# Patient Record
Sex: MALE | Employment: UNEMPLOYED | URBAN - METROPOLITAN AREA
[De-identification: names, ages, dates, MRNs, and addresses within clinical notes are randomized per-mention and may not be internally consistent; named-entity substitution may affect disease eponyms.]

---

## 2019-12-29 ENCOUNTER — APPOINTMENT (OUTPATIENT)
Dept: CT IMAGING | Facility: CLINIC | Age: 49
End: 2019-12-29
Attending: EMERGENCY MEDICINE
Payer: COMMERCIAL

## 2019-12-29 ENCOUNTER — HOSPITAL ENCOUNTER (EMERGENCY)
Facility: CLINIC | Age: 49
Discharge: HOME | End: 2019-12-29
Attending: EMERGENCY MEDICINE | Admitting: EMERGENCY MEDICINE
Payer: COMMERCIAL

## 2019-12-29 VITALS
OXYGEN SATURATION: 97 % | WEIGHT: 266.4 LBS | TEMPERATURE: 98 F | RESPIRATION RATE: 13 BRPM | BODY MASS INDEX: 40.38 KG/M2 | HEART RATE: 91 BPM | HEIGHT: 68 IN | DIASTOLIC BLOOD PRESSURE: 74 MMHG | SYSTOLIC BLOOD PRESSURE: 140 MMHG

## 2019-12-29 DIAGNOSIS — H53.9 VISUAL DISTURBANCE: ICD-10-CM

## 2019-12-29 LAB
ANION GAP SERPL CALCULATED.3IONS-SCNC: 7 MMOL/L (ref 3–14)
APTT PPP: 28 SEC (ref 22–37)
BASOPHILS # BLD AUTO: 0 10E9/L (ref 0–0.2)
BASOPHILS NFR BLD AUTO: 0.4 %
BUN SERPL-MCNC: 23 MG/DL (ref 7–30)
CALCIUM SERPL-MCNC: 8.8 MG/DL (ref 8.5–10.1)
CHLORIDE SERPL-SCNC: 107 MMOL/L (ref 94–109)
CO2 SERPL-SCNC: 26 MMOL/L (ref 20–32)
CREAT SERPL-MCNC: 0.86 MG/DL (ref 0.66–1.25)
DIFFERENTIAL METHOD BLD: NORMAL
EOSINOPHIL # BLD AUTO: 0.3 10E9/L (ref 0–0.7)
EOSINOPHIL NFR BLD AUTO: 3.2 %
ERYTHROCYTE [DISTWIDTH] IN BLOOD BY AUTOMATED COUNT: 13.8 % (ref 10–15)
GFR SERPL CREATININE-BSD FRML MDRD: >90 ML/MIN/{1.73_M2}
GLUCOSE SERPL-MCNC: 146 MG/DL (ref 70–99)
HCT VFR BLD AUTO: 41.7 % (ref 40–53)
HGB BLD-MCNC: 14.3 G/DL (ref 13.3–17.7)
IMM GRANULOCYTES # BLD: 0 10E9/L (ref 0–0.4)
IMM GRANULOCYTES NFR BLD: 0.3 %
INR PPP: 1.03 (ref 0.86–1.14)
LYMPHOCYTES # BLD AUTO: 4.2 10E9/L (ref 0.8–5.3)
LYMPHOCYTES NFR BLD AUTO: 44.9 %
MCH RBC QN AUTO: 28.5 PG (ref 26.5–33)
MCHC RBC AUTO-ENTMCNC: 34.3 G/DL (ref 31.5–36.5)
MCV RBC AUTO: 83 FL (ref 78–100)
MONOCYTES # BLD AUTO: 0.8 10E9/L (ref 0–1.3)
MONOCYTES NFR BLD AUTO: 8.4 %
NEUTROPHILS # BLD AUTO: 4 10E9/L (ref 1.6–8.3)
NEUTROPHILS NFR BLD AUTO: 42.8 %
NRBC # BLD AUTO: 0 10*3/UL
NRBC BLD AUTO-RTO: 0 /100
PLATELET # BLD AUTO: 291 10E9/L (ref 150–450)
POTASSIUM SERPL-SCNC: 3.2 MMOL/L (ref 3.4–5.3)
RBC # BLD AUTO: 5.01 10E12/L (ref 4.4–5.9)
SODIUM SERPL-SCNC: 140 MMOL/L (ref 133–144)
WBC # BLD AUTO: 9.4 10E9/L (ref 4–11)

## 2019-12-29 PROCEDURE — 85610 PROTHROMBIN TIME: CPT | Performed by: EMERGENCY MEDICINE

## 2019-12-29 PROCEDURE — 99291 CRITICAL CARE FIRST HOUR: CPT | Mod: 25

## 2019-12-29 PROCEDURE — 70450 CT HEAD/BRAIN W/O DYE: CPT

## 2019-12-29 PROCEDURE — 99207 ZZC NO BILLABLE SERVICE THIS VISIT: CPT | Mod: GO | Performed by: PSYCHIATRY & NEUROLOGY

## 2019-12-29 PROCEDURE — 93005 ELECTROCARDIOGRAM TRACING: CPT

## 2019-12-29 PROCEDURE — 80048 BASIC METABOLIC PNL TOTAL CA: CPT | Performed by: EMERGENCY MEDICINE

## 2019-12-29 PROCEDURE — 25000125 ZZHC RX 250: Performed by: EMERGENCY MEDICINE

## 2019-12-29 PROCEDURE — 99292 CRITICAL CARE ADDL 30 MIN: CPT

## 2019-12-29 PROCEDURE — 0042T CT HEAD PERFUSION WITH CONTRAST: CPT

## 2019-12-29 PROCEDURE — 85730 THROMBOPLASTIN TIME PARTIAL: CPT | Performed by: EMERGENCY MEDICINE

## 2019-12-29 PROCEDURE — 70496 CT ANGIOGRAPHY HEAD: CPT

## 2019-12-29 PROCEDURE — 25000128 H RX IP 250 OP 636: Performed by: EMERGENCY MEDICINE

## 2019-12-29 PROCEDURE — 85025 COMPLETE CBC W/AUTO DIFF WBC: CPT | Performed by: EMERGENCY MEDICINE

## 2019-12-29 RX ORDER — IOPAMIDOL 755 MG/ML
120 INJECTION, SOLUTION INTRAVASCULAR ONCE
Status: COMPLETED | OUTPATIENT
Start: 2019-12-29 | End: 2019-12-29

## 2019-12-29 RX ADMIN — IOPAMIDOL 120 ML: 755 INJECTION, SOLUTION INTRAVENOUS at 04:09

## 2019-12-29 RX ADMIN — SODIUM CHLORIDE 100 ML: 9 INJECTION, SOLUTION INTRAVENOUS at 04:09

## 2019-12-29 ASSESSMENT — ENCOUNTER SYMPTOMS
ABDOMINAL PAIN: 0
VOMITING: 0
WEAKNESS: 1
HEADACHES: 1
SHORTNESS OF BREATH: 0

## 2019-12-29 NOTE — ED AVS SNAPSHOT
Emergency Department  [address] [place] [address]  Phone:  [phone]  Fax:  [phone]                                    Reji Quiroz   MRN: [id number]    Department:   Emergency Department   Date of Visit:  12/29/2019           After Visit Summary Signature Page    I have received my discharge instructions, and my questions have been answered. I have discussed any challenges I see with this plan with the nurse or doctor.    ..........................................................................................................................................  Patient/Patient Representative Signature      ..........................................................................................................................................  Patient Representative Print Name and Relationship to Patient    ..................................................               ................................................  Date                                   Time    ..........................................................................................................................................  Reviewed by Signature/Title    ...................................................              ..............................................  Date                                               Time          22EPIC Rev 08/18

## 2019-12-29 NOTE — ED PROVIDER NOTES
"  History     Chief Complaint:    Cerebrovascular Accident      The history is provided by the patient, the spouse and the EMS personnel.      Reji Quiroz is a 49 year old male with a history of TIA, heart block, hypertension, and pacemaker placement who presents via EMS with right sided vision loss, right sided weakness, and a \"splitting\" headache that onset at 0230 this morning while the patient was watching TV. The patient felt normal prior to onset. The patient was hospitalized two weeks ago for a TIA. He was sent home on Plavix and his symptoms had completely resolved. He states his symptoms today felt similar. Now, he reports his vision is just blurry but he still feels week. The patient has been taking all of his medications as prescribed. Of note, the patient needs a right hip replacement.     Allergies:  No Known Drug Allergies     Medications:    Colace  Valtrex  Valium  Flomax  Revatio  Lisinopril  Norvasc  Prilosec  Ultram  Neurontin  Albuterol inhaler  Aspirin 81 mg   Plavix  Lipitor    Past Medical History:    Vasovagal syncope  Heart block AV second degree  Renal Calculi  Hypertension  KATHLEEN  TIA    Past Surgical History:    Shoulder arthroplasty  Sigmoidectomy  Appendectomy     Family History:    Father: heart disease     Social History:  Presents to the ED with EMS and wife at the bedside  Tobacco Use: former smoker  Alcohol Use: no  Drug Use: no     Review of Systems   Eyes: Positive for visual disturbance.   Respiratory: Negative for shortness of breath.    Cardiovascular: Negative for chest pain.   Gastrointestinal: Negative for abdominal pain and vomiting.   Neurological: Positive for weakness and headaches.   All other systems reviewed and are negative.        Physical Exam     Patient Vitals for the past 24 hrs:   BP Temp Temp src Pulse Heart Rate Resp SpO2 Height Weight   12/29/19 0423 137/83 98  F (36.7  C) Oral 93 93 14 96 % 1.727 m (5' 8\") --   12/29/19 0345 -- -- -- -- -- -- -- -- 120.8 " kg (266 lb 6.4 oz)       Physical Exam  General: Appears well-developed and well-nourished.   Head: No signs of trauma.   Mouth/Throat: Oropharynx is clear and moist.   Eyes: Conjunctivae are normal. Pupils are equal, round, and reactive to light.   Neck: Normal range of motion. No nuchal rigidity. No cervical adenopathy  CV: Normal rate and regular rhythm.    Resp: Effort normal and breath sounds normal. No respiratory distress.   GI: Soft. There is no tenderness.  No rebound or guarding.  Normal bowel sounds.  No CVA tenderness.  MSK: Normal range of motion. no edema. No Calf tenderness.  Neuro: The patient is alert and oriented to person, place, and time.  PERRLA, EOMI, Slight decrease in  strength to right hand compared to left.  Able to hold arms up without difficulty.  Difficulty in lifting right leg secondary to chronic hip pain, at baseline.  Sensation normal. Speech normal.  Visual fields intact.  No facial droop.  GCS 15  Skin: Skin is warm and dry. No rash noted.   Psych: normal mood and affect. behavior is normal.     Emergency Department Course     ECG:  Indication: cerebrovascular incident  Time: 0423  Vent. Rate 94 bpm. MO interval 174. QRS duration 70. QT/QTc 348/435. P-R-T axis 43 32 -10.  Normal sinus rhythm. Cannot rule out anterior infarct, age undetermined. Abnormal EKG. Read time: 0447.     Imaging:  Radiology findings were communicated with the patient who voiced understanding of the findings.    CT Head w/o IV contrast:   No acute intracranial process.    CTA Head Neck  w/ IV contrast:   Normal CTA [place].  Normal neck CTA.    CT Head Perfusion w/ IV contrast:   Normal cerebral perfusion.    Readings as per radiology.     Laboratory:  Laboratory findings were communicated with the patient who voiced understanding of the findings.    CBC: WBC: 9.4, HGB: 14.3, PLT: 291  BMP: Potassium 3.2 (L), Glucose 146 (H) o/w WNL (Creatinine 0.86)  INR: 1.03  PTT: 28    Emergency Department  Course:  Past medical records, nursing notes, and vitals reviewed.    EKG obtained in the ED, see results above.   IV was inserted and blood was drawn for laboratory testing, results above.  The patient was sent for a CT while in the emergency department, results above.     0342: Patient arrives via EMS  0343: EKG obtained.   0343: I performed an exam of the patient as documented above.   0345: Code stroke initiated.   0347: I consulted with stroke neuro regarding the patient's history and presentation here in the emergency department.  0350: Patient leaves for a CT scan.   0351: I discussed the patient's history with his wife.   0415: I consulted with stroke neuro.   0417: Patient rechecked and updated.    0434: Code stroke deescalated.   0443: I consulted with Dr. Clark, stroke/neruo, regarding his examination of the patient.   0445: Patient rechecked and updated.      Findings and plan explained to the Patient. Patient discharged home with instructions regarding supportive care, medications, and reasons to return. The importance of close follow-up was reviewed.     I personally reviewed the laboratory and imaging results with the Patient and answered all related questions prior to discharge.      Impression & Plan     Medical Decision Making:  Reji Quiroz is a 49-year-old gentleman who presents due to visual disturbance and right-sided weakness.  Symptoms started while the patient was awake at approximately 0230.  He apparently has a history of having repeated episodes with the same symptoms and he was actually seen at [facility] just a few weeks ago for the same complaint.  On my evaluation, he did have a slightly decreased  strength on the right compared to the left although his gross motor movements were appropriate.  He did not seem to have a visual field cut, but rather had decreased vision in the right eye only.  At the time of my evaluation, he stated it felt like the vision was improving and is now  only somewhat blurry.  I did call a code stroke and imaging was obtained that did not show any signs of acute process.  Dr. Clark did evaluate the patient as well.  On repeat evaluation, the patient did have improvement of his strength and reports that his vision was also continuing to improve.  Based on his symptoms, the distribution affecting the right eye itself along with right arm numbness would be somewhat surprising for acute stroke as these are somewhat different vascular systems.  After speaking with the patient, his symptoms may potentially be related to migraine and stress reaction.  Patient is already on TIA stroke prevention therapy with aspirin and Plavix.  Dr. Clark and myself did discuss the option for admission, but ultimately the patient felt comfortable with going home which Dr. Clark felt was appropriate as he would not do any further work-up such as MRI or changes in medication.  Patient and his wife were given very clear instructions to return immediately if he had any worsening symptoms or further concerns.  Patient does have plans to follow-up with both neurology and ophthalmology which she was encouraged to do.    Discharge Diagnosis:    ICD-10-CM    1. Visual disturbance H53.9            Disposition:  Discharged home.      Scribe Disclosure:  I, Abby Clayton, am serving as a scribe at 3:47 AM on 12/29/2019 to document services personally performed by Delvis Wharton MD based on my observations and the provider's statements to me.     12/29/2019    EMERGENCY DEPARTMENT       Delvis Wharton MD  12/29/19 0757

## 2019-12-29 NOTE — CONSULTS
"[facility]    Stroke Consult Note    Reason for Consult: acute onset right-sided weakness, right visual field loss    Chief Complaint: Cerebrovascular Accident      HPI  Reji Quiroz is a 49 year old male with sick sinus syndrome s/p pacemaker, degenerative hip disease, HTN, who presents with gradual onset vision loss right eye and mild right sided weakness.  He describes his weakness as \"jitteriness\" and states that he was really worked up and stressed.  He has had 5 similar episodes per himself and his wife and these only occur when he is \"worked up\".  He has had MRI scans and Islam that were unrevealing.  His recent stroke work-up was unremarkable and he was started on dual antiplatelet therapy early this month which he is still taking.    He notes no clear history of migraines, but does have headaches and has seen a scotoma (\"flashing lights\") one time.  He is certain that his vision loss was his right eye alone and non his visual field.  He remembers the events clearly and has never had a seizure.    CT/CTA unrevealing.  CTP mild hypoperfusion in the left temporal and occipital lobe.    TPA Treatment   Not given due to minor / isolated / quickly resolving stroke symptoms.    Endovascular Treatment  Not initiated due to absence of proximal vessel occlusion    Impression  1. Recurrent vision loss right eye  2. Recurrent right sided weakness.     Because these events generally occur together and are stereotyped it is less likely that they represent stroke without underlying focal atherosclerosis/stenosis.  It is difficult to localize his symptoms which occur on the right body and right eye.    Stereotyped events may occur in the setting of aura, partial seizure, amyloid spells/cortical siderosis which may explain the hypoperfusion seen on CTP.    Recommendations  1. Continue stroke secondary prevention as per work-up on prior admission  2. Follow-up at [facility] for Stroke  3. Follow-up " with ophthalmology    Patient Follow-up    As above.  Patient already has follow-up set up from his recent evaluation for similar symptoms    Thank you for this consult. No further stroke evaluation is recommended, so we will sign off. Please contact us with any additional questions.    Keny Salgado MD, MS  Vascular Neurology    Text Page ([phone])  ______________________________________________________    Past Medical History   No past medical history on file.  Past Surgical History   No past surgical history on file.  Medications   Home Meds  Prior to Admission medications    Not on File       Scheduled Meds      Infusion Meds      PRN Meds      Allergies   No Known Allergies  Family History   No family history on file.  Social History   Social History     Tobacco Use     Smoking status: Not on file   Substance Use Topics     Alcohol use: Not on file     Drug use: Not on file       Review of Systems   The 10 point Review of Systems is negative other than noted in the HPI or here.        PHYSICAL EXAMINATION  Temp:  [98  F (36.7  C)] 98  F (36.7  C)  Pulse:  [93] 93  Heart Rate:  [93] 93  Resp:  [14] 14  BP: (137)/(83) 137/83  SpO2:  [96 %] 96 %     Neuro:  Mental status: Awake, alert, attentive, oriented x3. Speech is fluent, comprehension and repetition intact. No dysarthria.  Good historian.  No neglect.  Cranial nerves: EOMI, visual fields full, right eye remains blurry but he can see, left eye normal, face symmetric, facial sensation intact, shoulder shrug strong, tongue/uvula midline, hearing intact to conversation.  Motor: 5/5 strength, he is has degenerative disease of the right hip and testing is limited due to pain  Sensory (assessed via nursing): Intact to light touch in face, arms, and legs  Coordination: Finger to nose intact bilaterally without dysmetria.  Normal heel-shin test bilaterally  Gait: Deferred         Stroke Scales    NIHSS  Interval baseline (12/29/19 4746)   Interval Comments     1a.  Level of Consciousness 0-->Alert, keenly responsive   1b. LOC Questions 0-->Answers both questions correctly   1c. LOC Commands 0-->Performs both tasks correctly   2.   Best Gaze 0-->Normal   3.   Visual 0-->No visual loss   4.   Facial Palsy 0-->Normal symmetrical movements   5a. Motor Arm, Left 0-->No drift, limb holds 90 (or 45) degrees for full 10 secs   5b. Motor Arm, Right 0-->No drift, limb holds 90 (or 45) degrees for full 10 secs   6a. Motor Leg, Left 0-->No drift, leg holds 30 degree position for full 5 secs   6b. Motor Leg, right 1-->Drift, leg falls by the end of the 5-sec period but does not hit bed   7.   Limb Ataxia 0-->Absent   8.   Sensory 0-->Normal, no sensory loss   9.   Best Language 0-->No aphasia, normal   10. Dysarthria 0-->Normal   11. Extinction and Inattention  0-->No abnormality   Total 1 (12/29/19 0456)       Imaging  I personally reviewed all imaging; relevant findings per HPI.     Lab Results Data   CBC  Recent Labs   Lab 12/29/19 0346   WBC 9.4   RBC 5.01   HGB 14.3   HCT 41.7        Basic Metabolic Panel    Recent Labs   Lab 12/29/19 0346      POTASSIUM 3.2*   CHLORIDE 107   CO2 26   BUN 23   CR 0.86   *   EVETTE 8.8     Liver Panel  No lab results found.  INR  Recent Labs   Lab Test 12/29/19 0346   INR 1.03      Lipid ProfileNo lab results found.  A1CNo lab results found.  Troponin Whitney results for input(s): TROPI in the last 168 hours.       Stroke Code / Stroke Consult Data Data   Stroke Code Data  (for stroke code with tele)  Stroke code activated 12/29/19   0347   First stroke provider response 12/29/19   0349   Video start time 12/29/19   0420   Video end time 12/29/19   0442   Last known normal 12/29/19   0239   Time of discovery  (or onset of symptoms)  12/29/19   0239   Head CT read by me 12/29/19   0400   Was stroke code de-escalated? Yes 12/29/19 0436  symptoms not likely caused by stroke        Telestroke Service Details  Type of service telemedicine  diagnostic assessment of acute neurological changes   Reason telemedicine is appropriate patient requires assessment with a specialist for diagnosis and treatment of neurological symptoms   Mode of transmission secure interactive audio and video communication per [place]   Originating site (patient location) [facility]    Distant site (provider location) Provider remote site       I have personally spent a total of 50 minutes providing critical care services supervising this patient's stroke code activation.  I personally reviewed all lab values and radiology images.  I evaluated and directed care for the patient's critical condition.  At the completion of the evaluation it was determined that IV tPA and endovascular treatment were not indicated and the stroke code was de-escalated.

## 2019-12-29 NOTE — DISCHARGE INSTRUCTIONS
Your work up today was reassuring.  There is no clear signs of stroke at this time and you are already on the appropriate medications to treat and prevent further stroke.  Please follow up and and return as discussed.

## 2019-12-29 NOTE — ED TRIAGE NOTES
Per EMS pt c/o sudden headache 10/10 and loss of right eye vision around 0230, right sided weakness.  Hx of 4 strokes (last one 2 weeks ago) pt is on plavix. Also has a pacemaker.